# Patient Record
Sex: FEMALE | Race: WHITE | NOT HISPANIC OR LATINO | Employment: FULL TIME | URBAN - METROPOLITAN AREA
[De-identification: names, ages, dates, MRNs, and addresses within clinical notes are randomized per-mention and may not be internally consistent; named-entity substitution may affect disease eponyms.]

---

## 2017-02-09 ENCOUNTER — APPOINTMENT (OUTPATIENT)
Dept: VASCULAR SURGERY | Facility: CLINIC | Age: 37
End: 2017-02-09
Payer: COMMERCIAL

## 2017-02-09 PROCEDURE — 99207 ZZC VEINSOLUTIONS FREE SCREENING: CPT | Performed by: SURGERY

## 2017-02-10 ENCOUNTER — APPOINTMENT (OUTPATIENT)
Dept: VASCULAR SURGERY | Facility: CLINIC | Age: 37
End: 2017-02-10
Payer: COMMERCIAL

## 2017-02-10 PROCEDURE — 99203 OFFICE O/P NEW LOW 30 MIN: CPT | Performed by: SURGERY

## 2017-02-10 PROCEDURE — 93970 EXTREMITY STUDY: CPT | Performed by: SURGERY

## 2017-03-30 ENCOUNTER — APPOINTMENT (OUTPATIENT)
Dept: VASCULAR SURGERY | Facility: CLINIC | Age: 37
End: 2017-03-30
Payer: COMMERCIAL

## 2017-03-30 PROCEDURE — 99207 ZZC NO CHARGE LOS: CPT | Performed by: SURGERY

## 2017-03-30 PROCEDURE — 36475 ENDOVENOUS RF 1ST VEIN: CPT | Performed by: SURGERY

## 2017-03-30 PROCEDURE — S9999 SALES TAX: HCPCS | Performed by: SURGERY

## 2017-03-30 PROCEDURE — 36468 NJX SCLRSNT SPIDER VEINS: CPT | Performed by: SURGERY

## 2017-03-30 PROCEDURE — 37765 STAB PHLEB VEINS XTR 10-20: CPT | Mod: RT | Performed by: SURGERY

## 2017-04-03 ENCOUNTER — APPOINTMENT (OUTPATIENT)
Dept: VASCULAR SURGERY | Facility: CLINIC | Age: 37
End: 2017-04-03
Payer: COMMERCIAL

## 2017-04-03 PROCEDURE — 93971 EXTREMITY STUDY: CPT | Performed by: SURGERY

## 2017-04-03 PROCEDURE — 99207 ZZC VEINSOLUTIONS 48 HOUR: CPT | Performed by: SURGERY

## 2017-05-12 ENCOUNTER — APPOINTMENT (OUTPATIENT)
Dept: VASCULAR SURGERY | Facility: CLINIC | Age: 37
End: 2017-05-12
Payer: COMMERCIAL

## 2017-05-12 PROCEDURE — 36468 NJX SCLRSNT SPIDER VEINS: CPT | Performed by: SURGERY

## 2017-05-12 PROCEDURE — S9999 SALES TAX: HCPCS | Performed by: SURGERY

## 2017-05-12 PROCEDURE — 99207 ZZC VEINSOLUTIONS POST OPERATIVE VISIT: CPT | Performed by: SURGERY

## 2018-05-08 ENCOUNTER — TELEPHONE (OUTPATIENT)
Dept: OTHER | Facility: CLINIC | Age: 38
End: 2018-05-08

## 2018-05-08 NOTE — TELEPHONE ENCOUNTER
5/8/2018    Call Regarding Onboarding [employer]    Attempt 2    Message on voicemail     Comments:       Outreach   SB

## 2018-05-30 NOTE — TELEPHONE ENCOUNTER
5/30/2018    Call Regarding Onboarding [employer] OTHER    Attempt 2    Message on voicemail     Comments: no dep      Outreach   STACIE

## 2021-04-17 ENCOUNTER — HEALTH MAINTENANCE LETTER (OUTPATIENT)
Age: 41
End: 2021-04-17

## 2021-09-26 ENCOUNTER — HEALTH MAINTENANCE LETTER (OUTPATIENT)
Age: 41
End: 2021-09-26

## 2022-05-08 ENCOUNTER — HEALTH MAINTENANCE LETTER (OUTPATIENT)
Age: 42
End: 2022-05-08

## 2023-03-17 ENCOUNTER — OFFICE VISIT (OUTPATIENT)
Dept: ORTHOPEDICS | Facility: CLINIC | Age: 43
End: 2023-03-17
Payer: COMMERCIAL

## 2023-03-17 ENCOUNTER — ANCILLARY PROCEDURE (OUTPATIENT)
Dept: GENERAL RADIOLOGY | Facility: CLINIC | Age: 43
End: 2023-03-17
Attending: STUDENT IN AN ORGANIZED HEALTH CARE EDUCATION/TRAINING PROGRAM
Payer: COMMERCIAL

## 2023-03-17 DIAGNOSIS — S69.92XA INJURY OF LEFT THUMB, INITIAL ENCOUNTER: Primary | ICD-10-CM

## 2023-03-17 DIAGNOSIS — S69.92XA INJURY OF LEFT THUMB, INITIAL ENCOUNTER: ICD-10-CM

## 2023-03-17 PROCEDURE — 73130 X-RAY EXAM OF HAND: CPT | Mod: LT | Performed by: RADIOLOGY

## 2023-03-17 PROCEDURE — 99204 OFFICE O/P NEW MOD 45 MIN: CPT | Mod: 25 | Performed by: STUDENT IN AN ORGANIZED HEALTH CARE EDUCATION/TRAINING PROGRAM

## 2023-03-17 RX ORDER — ONDANSETRON 4 MG/1
TABLET, ORALLY DISINTEGRATING ORAL
COMMUNITY
Start: 2023-03-12

## 2023-03-17 RX ORDER — ZOLPIDEM TARTRATE 10 MG/1
1 TABLET ORAL
COMMUNITY
Start: 2022-11-28

## 2023-03-17 RX ORDER — LIRAGLUTIDE 6 MG/ML
INJECTION, SOLUTION SUBCUTANEOUS
COMMUNITY
Start: 2023-01-31

## 2023-03-17 RX ORDER — METHYLPHENIDATE HYDROCHLORIDE 20 MG/1
1 TABLET ORAL
COMMUNITY
Start: 2022-12-29

## 2023-03-17 RX ORDER — CHOLECALCIFEROL (VITAMIN D3) 1250 MCG
1250 CAPSULE ORAL
COMMUNITY
Start: 2022-07-07

## 2023-03-17 RX ORDER — ESCITALOPRAM OXALATE 10 MG/1
1 TABLET ORAL
COMMUNITY
Start: 2022-09-12

## 2023-03-17 RX ORDER — BUPROPION HYDROCHLORIDE 300 MG/1
TABLET ORAL
COMMUNITY
Start: 2022-09-12

## 2023-03-17 RX ORDER — HYDROCODONE BITARTRATE AND ACETAMINOPHEN 5; 325 MG/1; MG/1
1 TABLET ORAL PRN
COMMUNITY
Start: 2023-03-12

## 2023-03-17 NOTE — LETTER
3/17/2023         RE: Selam Salazar  [address] [address]        Dear Colleague,    Thank you for referring your patient, Selam Salazar, to the [facility]. Please see a copy of my visit note below.    Ortho Hand    HPI: 43-year-old left-hand-dominant non-smoker presenting with a left thumb injury.  The hinge on the oven snapped onto her left thumb on Sunday, March 12.  She went to [facility], had a washout, and laceration repair.  She presents today with possible extensor injury.  No other injuries.  She took antibiotics    ROS: Negative, see HPI  Past medical history: Hypertension, depression, history of multiple DVT and PE.  Was genetic tested but no hypercoagulability.  Past surgical history: None to the hands or wrist  Medications: No blood thinners, no oral contraceptives  Allergies: None  Social: Non-smoker, denies any tobacco or nicotine use  Family history: No bleeding or clotting problems, or issues with anesthesia    Examination:  Nonlabored breathing  Not distressed  Left thumb extensor zone 1 complex laceration repair with intact EPL and inability to hyperextend the left thumb IP joint    Reviewed photograph from [facility] showing exposed tendon with what may be a partial laceration    XR: No visible left thumb fracture    A/P: 43-year-old female 5 days status post left thumb injury concerning for partial EPL laceration    -On exam, patient has intact EPL function.  Though there may be a partial laceration, my recommendation is to splint the thumb in hyperextension at the IP joint for an additional several weeks.  If patient notices loss of function at the left thumb IP joint with an inability to hyperextend, patient should return to clinic.  However, since the sutures need to be removed next week, patient will return next week for reevaluation.  Patient is agreeable to plan.  -OT for left hand-based thumb spica Orthoplast splint, that  extends beyond the left thumb IP joint with the IP joint slightly hyperextended  -A total of 45 minutes was devoted to review of chart, direct face-to-face patient counseling and documentation during this encounter, exclusive of any procedure performed.    Sarwat Welch MD, PhD      Cast/splint application    Date/Time: 3/17/2023 4:17 PM  Performed by: Km Greenwood, EMT  Authorized by: Sarwat Welch MD     Consent:     Consent obtained:  Verbal    Consent given by:  Patient    Risks discussed:  Discoloration and numbness    Alternatives discussed:  No treatment  Pre-procedure details:     Sensation:  Normal  Procedure details:     Location:  Hand    Hand:  L hand    Strapping: no      Splint type: Thumb spica.    Supplies used: Ortho glass.  Post-procedure details:     Pain:  Unchanged    Pain level:  5/10    Sensation:  Normal    Patient tolerance of procedure:  Tolerated well, no immediate complications    Patient provided with cast or splint care instructions: Yes              Again, thank you for allowing me to participate in the care of your patient.        Sincerely,        Sarwat Welch MD

## 2023-03-17 NOTE — PROGRESS NOTES
Ortho Hand    HPI: 43-year-old left-hand-dominant non-smoker presenting with a left thumb injury.  The hinge on the oven snapped onto her left thumb on Sunday, March 12.  She went to [facility], had a washout, and laceration repair.  She presents today with possible extensor injury.  No other injuries.  She took antibiotics    ROS: Negative, see HPI  Past medical history: Hypertension, depression, history of multiple DVT and PE.  Was genetic tested but no hypercoagulability.  Past surgical history: None to the hands or wrist  Medications: No blood thinners, no oral contraceptives  Allergies: None  Social: Non-smoker, denies any tobacco or nicotine use  Family history: No bleeding or clotting problems, or issues with anesthesia    Examination:  Nonlabored breathing  Not distressed  Left thumb extensor zone 1 complex laceration repair with intact EPL and inability to hyperextend the left thumb IP joint    Reviewed photograph from [facility] showing exposed tendon with what may be a partial laceration    XR: No visible left thumb fracture    A/P: 43-year-old female 5 days status post left thumb injury concerning for partial EPL laceration    -On exam, patient has intact EPL function.  Though there may be a partial laceration, my recommendation is to splint the thumb in hyperextension at the IP joint for an additional several weeks.  If patient notices loss of function at the left thumb IP joint with an inability to hyperextend, patient should return to clinic.  However, since the sutures need to be removed next week, patient will return next week for reevaluation.  Patient is agreeable to plan.  -OT for left hand-based thumb spica Orthoplast splint, that extends beyond the left thumb IP joint with the IP joint slightly hyperextended  -A total of 45 minutes was devoted to review of chart, direct face-to-face patient counseling and documentation during this encounter, exclusive of any procedure  performed.    Sarwat Welch MD, PhD

## 2023-03-17 NOTE — PROGRESS NOTES
Cast/splint application    Date/Time: 3/17/2023 4:17 PM    Performed by: Km Greenwood, EMT  Authorized by: Sarwat Welch MD    Consent:     Consent obtained:  Verbal    Consent given by:  Patient    Risks discussed:  Discoloration and numbness    Alternatives discussed:  No treatment  Pre-procedure details:     Sensation:  Normal  Procedure details:     Location:  Hand    Hand:  L hand    Strapping: no      Splint type: Thumb spica.    Supplies used: Ortho glass.  Post-procedure details:     Pain:  Unchanged    Pain level:  5/10    Sensation:  Normal    Patient tolerance of procedure:  Tolerated well, no immediate complications    Patient provided with cast or splint care instructions: Yes

## 2023-03-27 ENCOUNTER — TELEPHONE (OUTPATIENT)
Dept: ORTHOPEDICS | Facility: CLINIC | Age: 43
End: 2023-03-27

## 2023-03-27 NOTE — TELEPHONE ENCOUNTER
Patient has an appt with Dr. Welch last Friday for a 2 week follow up and had to to cancel due to emergency. Patient I calling back to reschedule but next opening for provider in  is 4/21. Patient is wondering if its okay to wait that long or should patient find sooner opening. Please advise.

## 2023-03-27 NOTE — TELEPHONE ENCOUNTER
Called Pt to offer spot with [facility] for Friday the 31st in [place]. If Pt calls back please offer spot at that clinic, if not able, may double book with Dr. Welch for Friday afternoon.     James MILLER ATC

## 2023-03-31 ENCOUNTER — OFFICE VISIT (OUTPATIENT)
Dept: ORTHOPEDICS | Facility: CLINIC | Age: 43
End: 2023-03-31
Payer: COMMERCIAL

## 2023-03-31 DIAGNOSIS — S69.92XA INJURY OF LEFT THUMB, INITIAL ENCOUNTER: Primary | ICD-10-CM

## 2023-03-31 PROCEDURE — 99212 OFFICE O/P EST SF 10 MIN: CPT | Performed by: STUDENT IN AN ORGANIZED HEALTH CARE EDUCATION/TRAINING PROGRAM

## 2023-03-31 ASSESSMENT — PAIN SCALES - GENERAL: PAINLEVEL: SEVERE PAIN (6)

## 2023-03-31 NOTE — LETTER
3/31/2023         RE: Selam Salazar  [address] [address]        Dear Colleague,    Thank you for referring your patient, Selam Salazar, to the [facility]. Please see a copy of my visit note below.    Ortho Hand    No issues.    On exam, left dorsal thumb laceration repair intact with no wounds or signs of infection.  Left EPL remains intact.    A/P: 43-year-old female 3 weeks status post left thumb injury concerning for partial EPL laceration    -Sutures removed today  -Placed patient into a removable left thumb spica splint.  Instructed patient to wear this for the next 3 to 4 weeks.  -Return to clinic in 3 to 4 weeks for reevaluation.  Patient should return sooner if there is abrupt loss of left thumb extension.  -A total of 10 minutes was devoted to review of chart, direct face-to-face patient counseling and documentation during this encounter, exclusive of any procedure performed.    Sarwat Welch MD, PhD          Again, thank you for allowing me to participate in the care of your patient.        Sincerely,        Sarwat Welch MD

## 2023-03-31 NOTE — NURSING NOTE
Chief Complaint   Patient presents with     Left Hand - Pain, Surgical Followup     Left thumb       There were no vitals filed for this visit.    There is no height or weight on file to calculate BMI.      MYRON Barbour NREMT

## 2023-03-31 NOTE — PROGRESS NOTES
Ortho Hand    No issues.    On exam, left dorsal thumb laceration repair intact with no wounds or signs of infection.  Left EPL remains intact.    A/P: 43-year-old female 3 weeks status post left thumb injury concerning for partial EPL laceration    -Sutures removed today  -Placed patient into a removable left thumb spica splint.  Instructed patient to wear this for the next 3 to 4 weeks.  -Return to clinic in 3 to 4 weeks for reevaluation.  Patient should return sooner if there is abrupt loss of left thumb extension.  -A total of 10 minutes was devoted to review of chart, direct face-to-face patient counseling and documentation during this encounter, exclusive of any procedure performed.    Sarwat Welch MD, PhD

## 2023-03-31 NOTE — NURSING NOTE
DME FITTING    Relevant Diagnosis: L Thumb laceration  Wrist brace with thumb spica was fit on patient's Left wrist.     Person(s) involved in teaching:   Patient    Brace was applied in standard Manner:  Yes  Brace fit well:  Yes  Patient reports brace to fit comfortably:  Yes    Education:   Patient shown self application and removal of brace: Yes  Patient shown how to adjust brace fit, if necessary: Yes  Patient educated on billing and return policy: Yes  Patient confirmed understanding when and how to contact clinic with concerns: Yes

## 2023-07-16 ENCOUNTER — HEALTH MAINTENANCE LETTER (OUTPATIENT)
Age: 43
End: 2023-07-16

## 2024-09-08 ENCOUNTER — HEALTH MAINTENANCE LETTER (OUTPATIENT)
Age: 44
End: 2024-09-08

## 2024-11-17 ENCOUNTER — HEALTH MAINTENANCE LETTER (OUTPATIENT)
Age: 44
End: 2024-11-17